# Patient Record
(demographics unavailable — no encounter records)

---

## 2024-11-27 NOTE — HISTORY OF PRESENT ILLNESS
[FreeTextEntry1] : Patient here for routine gynecological follow-up. Patient has a Mirena IUD since 2016. Patient with with light irregular menses. Patient states her last mammogram and sonogram was 2024 @ [facility] ordered by her PCP

## 2024-11-27 NOTE — DISCUSSION/SUMMARY
[FreeTextEntry1] : Patient had a  normal gynecological exam. Pap and cultures were done. Counseled regarding birth control options. Patient will return to office for removal and reinsertion of a Mirena IUD.

## 2024-11-27 NOTE — PHYSICAL EXAM
[Chaperone Present] : A chaperone was present in the examining room during all aspects of the physical examination [90760] : A chaperone was present during the pelvic exam. [FreeTextEntry2] : BILLY [TextEntry] :  Physical exam: Breast: No discharge no masses no adenopathy soft nontender nondistended Abdomen:  Pelvic exam: Vulva and vagina within normal limits. Cervix: Long closed posterior with visible and palpable IUD string Uterus: 8-week anteverted uterus No palpable adnexal masses were noted Patient refuses rectal examination.

## 2025-01-29 NOTE — PROCEDURE
[TextEntry] : Patient here for a removal of the Mirena IUD and insertion of a Mirena IUD as the Mirena is at the 8-year agnieszka. Last menstrual period was 1/15/2025 patient has light to scant menses. UCG negative today. Risk benefits and alternatives to continued IUD use reviewed with the patient detail patient wishes Mirena IUD  Procedure: After informed consent was patient was brought to the examination room and exam revealed an 8-week anteverted uterus with a palpable IUD string and along close cervix.  No palpable adnexal masses. A medium size speculum was placed in the vagina vagina was prepped with a Betadine solution and the anterior lip of the cervix was grasped with a single-tooth tenaculum.  The IUD Mirena was removed with a ring forcep comfortably.  Under direct ultrasound visualization the uterus was sounded to a depth of 7-1/4 cm.  Under direct ultrasound guidance the Mirena IUD was deployed appropriately and without difficulty.  Transabdominal ultrasound confirmed appropriate placement. All instruments removed there was good hemostasis patient tolerated procedure well and was observed for 15 minutes prior to discharge home with appropriate instructions and follow-up. Patient return to office in 4 to 6 weeks for IUD check.  Expectations reviewed

## 2025-03-04 NOTE — PLAN
[FreeTextEntry1] :  Patient with a normal exam and sonogram. Bleeding with Mirena IUD expectations reviewed. Patient will follow-up November 2025 for routine gynecological care

## 2025-03-04 NOTE — PROCEDURE
[TextEntry] : Transvaginal ultrasound: Normal-sized uterus normal lining with a centrally located Mirena IUD. Normal ovaries with a left hemorrhagic cyst

## 2025-03-04 NOTE — PHYSICAL EXAM
[Chaperone Present] : A chaperone was present in the examining room during all aspects of the physical examination [Labia Majora] : normal [Labia Minora] : normal [Polyp ___ cm] : [unfilled] ~[facility] polyp [IUD String] : an IUD string was noted [Normal] : normal [Anteversion] : anteverted [Uterine Adnexae] : normal [FreeTextEntry2] : kim luz

## 2025-03-04 NOTE — HISTORY OF PRESENT ILLNESS
[FreeTextEntry1] : Patient is a 44-year-old here for follow-up status post placement of Mirena IUD removal and replacement 1/29/2025. Patient with some mild staining status post procedure otherwise feeling well. LMP 2/5/2025